# Patient Record
Sex: MALE | Race: WHITE | NOT HISPANIC OR LATINO | Employment: FULL TIME | URBAN - METROPOLITAN AREA
[De-identification: names, ages, dates, MRNs, and addresses within clinical notes are randomized per-mention and may not be internally consistent; named-entity substitution may affect disease eponyms.]

---

## 2021-10-21 ENCOUNTER — HOSPITAL ENCOUNTER (EMERGENCY)
Facility: CLINIC | Age: 26
Discharge: HOME | End: 2021-10-21
Attending: NURSE PRACTITIONER | Admitting: NURSE PRACTITIONER

## 2021-10-21 VITALS
TEMPERATURE: 98.8 F | OXYGEN SATURATION: 100 % | HEART RATE: 98 BPM | DIASTOLIC BLOOD PRESSURE: 83 MMHG | SYSTOLIC BLOOD PRESSURE: 153 MMHG | RESPIRATION RATE: 18 BRPM

## 2021-10-21 DIAGNOSIS — S61.412A LACERATION OF LEFT PALM, INITIAL ENCOUNTER: ICD-10-CM

## 2021-10-21 PROCEDURE — 250N000009 HC RX 250: Performed by: EMERGENCY MEDICINE

## 2021-10-21 PROCEDURE — 99283 EMERGENCY DEPT VISIT LOW MDM: CPT

## 2021-10-21 PROCEDURE — 12002 RPR S/N/AX/GEN/TRNK2.6-7.5CM: CPT

## 2021-10-21 RX ADMIN — Medication 3 ML: at 14:39

## 2021-10-21 ASSESSMENT — ENCOUNTER SYMPTOMS
NUMBNESS: 0
WOUND: 1
WEAKNESS: 0

## 2021-10-21 NOTE — ED TRIAGE NOTES
Patient was drilling into a piece of steel which caught and the steel sliced his left palm. ABCs intact.

## 2021-10-21 NOTE — ED PROVIDER NOTES
History   Chief Complaint:  Laceration     The history is provided by the patient.      Jordan Garvey is an otherwise healthy right-handed 25 year old male who presents with laceration. The patient reports that he was drilling into a piece of steel just prior to arrival when his drill bit caught, causing a piece of metal to slice his left palm. He denies any numbness or tingling and is able to move his fingers. His last tetanus shot was in 2019.    Review of Systems   Skin: Positive for wound (left palm).   Neurological: Negative for weakness and numbness.   All other systems reviewed and are negative.      Allergies:  No Known Allergies    Medications:  The patient is not currently taking any daily medications.     Past Medical History:     The patient denies past medical history.     Past Surgical History:    Create eardrum opening     Family History:    The patient denies past family history.     Social History:  Presents to ED alone.    Physical Exam     Patient Vitals for the past 24 hrs:   BP Temp Temp src Pulse Resp SpO2   10/21/21 1434 (!) 153/83 98.8  F (37.1  C) Temporal 98 18 100 %       Physical Exam  General: Alert, No obvious discomfort, well kept   HENT:  Normal voice, No lymphadenopathy  Eyes:  The pupils are equal, round, and reactive to light, Conjunctiva normal, No scleral icterus   Neck:  Normal range of motion  CV:  Normal Pulses, Normal cap refill  Resp:  Non-labored, No cough  MS:  3.0 cm laceration of left palm neat base of thenar eminence, No indication for Flexor or extensor tendon injury, Normal ROM if all digits  Skin:  No rash or acute skin lesions noted  Neuro:  Speech is normal and fluent, Normal sensation  Psych:  Awake. Alert.  Normal affect.  Appropriate interactions. Good eye contact      Emergency Department Course     Procedures     Laceration Repair      LACERATION:  A simple and superficial clean 3 cm laceration.    LOCATION:  Left palm.    FUNCTION:  Distally sensation,  circulation, motor and tendon function are intact.    ANESTHESIA:  Local using 0.5% Bupivacaine total of 2.5 mLs and LET - Topical.    PREPARATION:  Irrigation with Techni-Care and Shur Clens.    DEBRIDEMENT:  No debridement.    CLOSURE:  Wound was closed with One Layer.  Skin closed with 5 x 4.0 Ethylon using interrupted sutures.    Emergency Department Course:  Reviewed:  I reviewed nursing notes, vitals, past medical history, Care Everywhere and [facility].    Assessments:  1520 I obtained history and examined the patient as noted above.     1558 I rechecked the patient and explained findings.     1608 I performed a laceration repair as noted above.     Disposition:  The patient was discharged to home.     Impression & Plan     Medical Decision Making:  Findings and exam were consistent with uncomplicated laceration of Left palm, which was repaired as noted above. There is no evidence at this time of associated fracture or foreign body, deep space infection, tendon injury, or neurovascular injury. Follow up in 5 days or sooner if concerns over healing. The patient is to follow-up for suture removal in 10-14 days. Indications for immediate return to ER/UR were reviewed and included but are not limited to, redness, fevers, drainage, increasing pain, high fevers, or other concerns.     Diagnosis:    ICD-10-CM    1. Laceration of left palm, initial encounter  S61.412A        Scribe Disclosure:  IRA, May Higginbotham, am serving as a scribe at 3:20 PM on 10/21/2021 to document services personally performed by Nghia Goncalves APRN based on my observations and the provider's statements to me.         Nghia Goncalves APRN CNP  10/21/21 2343

## (undated) RX ORDER — BUPIVACAINE HYDROCHLORIDE 5 MG/ML
INJECTION, SOLUTION PERINEURAL
Status: DISPENSED
Start: 2021-10-21